# Patient Record
(demographics unavailable — no encounter records)

---

## 2024-11-20 NOTE — DISCUSSION/SUMMARY
[de-identified] : All these findings were discussed with Ms. Hollingsworth.  We discussed patellofemoral syndrome in great detail.  Patient will start a course of physical therapy and will follow-up as needed.

## 2024-11-20 NOTE — PHYSICAL EXAM
[de-identified] : On physical exam patient walks without any limp.  She has forage motion about the hips and knees without pain.  There is significant tenderness palpation of bilateral patellofemoral articulations.  There is slight crepitus on patellar grind test.  There is no instability.  There is no effusion. [de-identified] :  bilateral hip and knee xrays were obtained in office today show: Standing AP pelvis and lateral views of bilateral hips were obtained. Bilateral hip x-ray demonstrates minimal to mild arthritic changes.  Standing AP, lateral and sunrise views of bilateral knees were obtained. Bilateral knee x-ray demonstrates Minimal osteoarthritic changes.  There is no significant loss of joint space except for lateral aspect of the right patella.  There is bilateral patella tilt.

## 2024-11-20 NOTE — HISTORY OF PRESENT ILLNESS
[Constant] : ~He/She~ states the symptoms seem to be constant [Heat] : relieved by heat [de-identified] : 45-year-old lady presents for relation of pain bilateral knees.  Pain is significantly more pronounced on the right side.  Reports pain in anterior knee which is bloated with walking and at night.  It is mild at rest.  Stairs make it worse; heat makes it better. Patient reports that significant other written factors to work.  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 [de-identified] : stairs